# Patient Record
Sex: MALE | Race: WHITE | NOT HISPANIC OR LATINO | URBAN - METROPOLITAN AREA
[De-identification: names, ages, dates, MRNs, and addresses within clinical notes are randomized per-mention and may not be internally consistent; named-entity substitution may affect disease eponyms.]

---

## 2017-04-05 ENCOUNTER — OUTPATIENT (OUTPATIENT)
Dept: OUTPATIENT SERVICES | Facility: HOSPITAL | Age: 52
LOS: 1 days | End: 2017-04-05

## 2017-04-05 ENCOUNTER — APPOINTMENT (OUTPATIENT)
Dept: RADIOLOGY | Facility: HOSPITAL | Age: 52
End: 2017-04-05

## 2017-04-05 DIAGNOSIS — R76.11 NONSPECIFIC REACTION TO TUBERCULIN SKIN TEST WITHOUT ACTIVE TUBERCULOSIS: ICD-10-CM

## 2020-02-05 ENCOUNTER — TRANSCRIPTION ENCOUNTER (OUTPATIENT)
Age: 55
End: 2020-02-05

## 2021-09-28 ENCOUNTER — EMERGENCY (EMERGENCY)
Facility: HOSPITAL | Age: 56
LOS: 1 days | Discharge: HOME | End: 2021-09-28
Attending: EMERGENCY MEDICINE
Payer: COMMERCIAL

## 2021-09-28 VITALS
SYSTOLIC BLOOD PRESSURE: 120 MMHG | TEMPERATURE: 98 F | HEART RATE: 80 BPM | RESPIRATION RATE: 18 BRPM | OXYGEN SATURATION: 100 % | DIASTOLIC BLOOD PRESSURE: 80 MMHG

## 2021-09-28 VITALS
DIASTOLIC BLOOD PRESSURE: 73 MMHG | WEIGHT: 154.98 LBS | HEIGHT: 72 IN | RESPIRATION RATE: 24 BRPM | SYSTOLIC BLOOD PRESSURE: 137 MMHG | HEART RATE: 98 BPM | OXYGEN SATURATION: 100 %

## 2021-09-28 LAB
ALBUMIN SERPL ELPH-MCNC: 4.8 G/DL — SIGNIFICANT CHANGE UP (ref 3.3–5)
ALP SERPL-CCNC: 95 U/L — SIGNIFICANT CHANGE UP (ref 40–120)
ALT FLD-CCNC: 13 U/L — SIGNIFICANT CHANGE UP (ref 10–45)
ANION GAP SERPL CALC-SCNC: 19 MMOL/L — HIGH (ref 5–17)
APPEARANCE UR: CLEAR — SIGNIFICANT CHANGE UP
AST SERPL-CCNC: 15 U/L — SIGNIFICANT CHANGE UP (ref 10–40)
BACTERIA # UR AUTO: NEGATIVE — SIGNIFICANT CHANGE UP
BASOPHILS # BLD AUTO: 0.09 K/UL — SIGNIFICANT CHANGE UP (ref 0–0.2)
BASOPHILS NFR BLD AUTO: 0.9 % — SIGNIFICANT CHANGE UP (ref 0–2)
BILIRUB SERPL-MCNC: 0.8 MG/DL — SIGNIFICANT CHANGE UP (ref 0.2–1.2)
BILIRUB UR-MCNC: NEGATIVE — SIGNIFICANT CHANGE UP
BUN SERPL-MCNC: 14 MG/DL — SIGNIFICANT CHANGE UP (ref 7–23)
CALCIUM SERPL-MCNC: 10.1 MG/DL — SIGNIFICANT CHANGE UP (ref 8.4–10.5)
CHLORIDE SERPL-SCNC: 101 MMOL/L — SIGNIFICANT CHANGE UP (ref 96–108)
CO2 SERPL-SCNC: 18 MMOL/L — LOW (ref 22–31)
COLOR SPEC: SIGNIFICANT CHANGE UP
CREAT SERPL-MCNC: 0.91 MG/DL — SIGNIFICANT CHANGE UP (ref 0.5–1.3)
DIFF PNL FLD: ABNORMAL
EOSINOPHIL # BLD AUTO: 0.23 K/UL — SIGNIFICANT CHANGE UP (ref 0–0.5)
EOSINOPHIL NFR BLD AUTO: 2.2 % — SIGNIFICANT CHANGE UP (ref 0–6)
EPI CELLS # UR: 2 /HPF — SIGNIFICANT CHANGE UP
GLUCOSE SERPL-MCNC: 148 MG/DL — HIGH (ref 70–99)
GLUCOSE UR QL: NEGATIVE — SIGNIFICANT CHANGE UP
HCT VFR BLD CALC: 44.4 % — SIGNIFICANT CHANGE UP (ref 39–50)
HGB BLD-MCNC: 15 G/DL — SIGNIFICANT CHANGE UP (ref 13–17)
IMM GRANULOCYTES NFR BLD AUTO: 0.3 % — SIGNIFICANT CHANGE UP (ref 0–1.5)
KETONES UR-MCNC: ABNORMAL
LEUKOCYTE ESTERASE UR-ACNC: NEGATIVE — SIGNIFICANT CHANGE UP
LYMPHOCYTES # BLD AUTO: 2.44 K/UL — SIGNIFICANT CHANGE UP (ref 1–3.3)
LYMPHOCYTES # BLD AUTO: 23.2 % — SIGNIFICANT CHANGE UP (ref 13–44)
MCHC RBC-ENTMCNC: 29.6 PG — SIGNIFICANT CHANGE UP (ref 27–34)
MCHC RBC-ENTMCNC: 33.8 GM/DL — SIGNIFICANT CHANGE UP (ref 32–36)
MCV RBC AUTO: 87.6 FL — SIGNIFICANT CHANGE UP (ref 80–100)
MONOCYTES # BLD AUTO: 0.81 K/UL — SIGNIFICANT CHANGE UP (ref 0–0.9)
MONOCYTES NFR BLD AUTO: 7.7 % — SIGNIFICANT CHANGE UP (ref 2–14)
NEUTROPHILS # BLD AUTO: 6.91 K/UL — SIGNIFICANT CHANGE UP (ref 1.8–7.4)
NEUTROPHILS NFR BLD AUTO: 65.7 % — SIGNIFICANT CHANGE UP (ref 43–77)
NITRITE UR-MCNC: NEGATIVE — SIGNIFICANT CHANGE UP
NRBC # BLD: 0 /100 WBCS — SIGNIFICANT CHANGE UP (ref 0–0)
PH UR: 7.5 — SIGNIFICANT CHANGE UP (ref 5–8)
PLATELET # BLD AUTO: 170 K/UL — SIGNIFICANT CHANGE UP (ref 150–400)
POTASSIUM SERPL-MCNC: 3.9 MMOL/L — SIGNIFICANT CHANGE UP (ref 3.5–5.3)
POTASSIUM SERPL-SCNC: 3.9 MMOL/L — SIGNIFICANT CHANGE UP (ref 3.5–5.3)
PROT SERPL-MCNC: 7.7 G/DL — SIGNIFICANT CHANGE UP (ref 6–8.3)
PROT UR-MCNC: ABNORMAL
RBC # BLD: 5.07 M/UL — SIGNIFICANT CHANGE UP (ref 4.2–5.8)
RBC # FLD: 12.4 % — SIGNIFICANT CHANGE UP (ref 10.3–14.5)
RBC CASTS # UR COMP ASSIST: 8 /HPF — HIGH (ref 0–4)
SODIUM SERPL-SCNC: 138 MMOL/L — SIGNIFICANT CHANGE UP (ref 135–145)
SP GR SPEC: 1.02 — SIGNIFICANT CHANGE UP (ref 1.01–1.02)
UROBILINOGEN FLD QL: NEGATIVE — SIGNIFICANT CHANGE UP
WBC # BLD: 10.51 K/UL — HIGH (ref 3.8–10.5)
WBC # FLD AUTO: 10.51 K/UL — HIGH (ref 3.8–10.5)
WBC UR QL: 3 /HPF — SIGNIFICANT CHANGE UP (ref 0–5)

## 2021-09-28 PROCEDURE — 51702 INSERT TEMP BLADDER CATH: CPT

## 2021-09-28 PROCEDURE — 99284 EMERGENCY DEPT VISIT MOD MDM: CPT | Mod: 25

## 2021-09-28 PROCEDURE — 96374 THER/PROPH/DIAG INJ IV PUSH: CPT | Mod: XU

## 2021-09-28 PROCEDURE — 81001 URINALYSIS AUTO W/SCOPE: CPT

## 2021-09-28 PROCEDURE — 51703 INSERT BLADDER CATH COMPLEX: CPT

## 2021-09-28 PROCEDURE — 80053 COMPREHEN METABOLIC PANEL: CPT

## 2021-09-28 PROCEDURE — 87086 URINE CULTURE/COLONY COUNT: CPT

## 2021-09-28 PROCEDURE — 85025 COMPLETE CBC W/AUTO DIFF WBC: CPT

## 2021-09-28 RX ORDER — MORPHINE SULFATE 50 MG/1
4 CAPSULE, EXTENDED RELEASE ORAL ONCE
Refills: 0 | Status: DISCONTINUED | OUTPATIENT
Start: 2021-09-28 | End: 2021-09-28

## 2021-09-28 RX ORDER — TAMSULOSIN HYDROCHLORIDE 0.4 MG/1
1 CAPSULE ORAL
Qty: 30 | Refills: 0
Start: 2021-09-28 | End: 2021-10-27

## 2021-09-28 RX ORDER — TAMSULOSIN HYDROCHLORIDE 0.4 MG/1
1 CAPSULE ORAL
Qty: 30 | Refills: 0 | DISCHARGE
Start: 2021-09-28 | End: 2021-10-27

## 2021-09-28 RX ADMIN — MORPHINE SULFATE 4 MILLIGRAM(S): 50 CAPSULE, EXTENDED RELEASE ORAL at 02:01

## 2021-09-28 NOTE — ED PROVIDER NOTE - NS ED ROS FT
GENERAL: No fever, no chills  EYES: no change in vision  HEENT: no trouble swallowing, no trouble speaking  CARDIAC: no chest pain, no palpitations  PULMONARY: no cough, no SOB  GI: +abdominal pain, no nausea, no vomiting, no diarrhea, no constipation  : no dysuria, no frequency, no change in appearance, no odor of urine  SKIN: no rashes  NEURO: no headache, no weakness  MSK: no joint pain

## 2021-09-28 NOTE — ED PROVIDER NOTE - ATTENDING CONTRIBUTION TO CARE
MD Nicole:  patient seen and evaluated personally.   I agree with the History & Physical,  Impression & Plan other than what was detailed in my note.  MD Nicole  57 y/o m presenting w/ urinary retention, abd pain in sp area that is severe, no known hx of bph but states has had increasing urination at night over past few months. no f/c n/v, dysuira, hematuria, no pain prior to retention, no back pain/injury. afebrile vitals stable,  non toxic well appearing, NC/AT,  conjunctiva non conjected, sclera anicteric, moist mucous membranes, neck supple, heart sounds, normal, no mrg, lungs cta b/l no wrr, abd soft non distended w/ no tenderness, no visual deformities of extremities, axox3, espinoza in place (placed by urology) , no abd ttp. plan to fu ua, cr, if neg dc home w/ espinoza to fu urology.

## 2021-09-28 NOTE — ED PROVIDER NOTE - CLINICAL SUMMARY MEDICAL DECISION MAKING FREE TEXT BOX
57y/o M w/ unknown PMH p/w gradual onset lower abdominal pain dull has gotten worse and has not been able to pass urine since apprx 4pm likely urinary retention. Plan Cr, UA/Ucx, espinoza to be place by urology, pain meds.

## 2021-09-28 NOTE — ED PROVIDER NOTE - PROGRESS NOTE DETAILS
Allan, PGY3: attempted Gleason passage with 16Fr and coudey without success. urology paged, will come see pt. Attending Masom:  pt feeling better, pain completely gone. comfortable with dc, cr normal, awaiting ua results, outpt fu w/ urology

## 2021-09-28 NOTE — ED ADULT NURSE NOTE - NSIMPLEMENTINTERV_GEN_ALL_ED
Implemented All Fall Risk Interventions:  [place] to call system. Call bell, personal items and telephone within reach. Instruct patient to call for assistance. Room bathroom lighting operational. Non-slip footwear when patient is off stretcher. Physically safe environment: no spills, clutter or unnecessary equipment. Stretcher in lowest position, wheels locked, appropriate side rails in place. Provide visual cue, wrist band, yellow gown, etc. Monitor gait and stability. Monitor for mental status changes and reorient to person, place, and time. Review medications for side effects contributing to fall risk. Reinforce activity limits and safety measures with patient and family.

## 2021-09-28 NOTE — ED PROVIDER NOTE - PHYSICAL EXAMINATION
Gen: uncomfortable appearing  Head: normal appearing  HEENT: normal conjunctiva, oral mucosa moist  Lung: no respiratory distress, speaking in full sentences, CTA b/l     CV: regular rate and rhythm, no murmurs  Abd: soft, suprpubic region distended with tenderness  MSK: no visible deformities  Neuro: No focal deficits, AAOx3  Skin: Warm  Psych: normal affect

## 2021-09-28 NOTE — PROCEDURE NOTE - NSAPPROACH_GEN_A_CORE
open Crescentic Intermediate Repair Preamble Text (Leave Blank If You Do Not Want): Undermining was performed with blunt dissection.

## 2021-09-28 NOTE — ED PROVIDER NOTE - NSFOLLOWUPCLINICS_GEN_ALL_ED_FT
[facility] - Urology  Urology  [address], [address]rd & [address]th [place] [address]  Phone: [phone]  Fax:   Follow Up Time: 4-6 Days

## 2021-09-28 NOTE — ED PROCEDURE NOTE - PROCEDURE ADDITIONAL DETAILS
Emergency Department Focused Ultrasound performed at patient's bedside for educational purposes. The study will have a follow up study performed (Gleason placement). 450cc calculated on bladder scan, Gleason drained 500cc urine

## 2021-09-28 NOTE — ED PROVIDER NOTE - OBJECTIVE STATEMENT
55y/o M w/ unknown PMH p/w gradual onset lower abdominal pain dull has gotten worse and has not been able to pass urine since apprx 4pm. Never had this happen before. No fevers, chills, nausea, vomiting, headache, chest pain, cough, sob, hemoptysis, changes in diet, diarrhea, obstipation, back pain, dysuria, hematuria, numbness, tingling, history of surgeries, trauma, recreational drugs, excessive alcohol.

## 2021-09-28 NOTE — ED PROVIDER NOTE - NSFOLLOWUPINSTRUCTIONS_ED_ALL_ED_FT
Urinary Retention    Urinary retention is the inability to completely empty your bladder. This is a common problem in older men, especially with enlarged prostates. If you are sent home with a espinoza catheter and a drainage system make sure to keep the drainage bag emptied and lower than your catheter. Keep the espinoza catheter in until you follow up with a urologist.    SEEK IMMEDIATE MEDICAL CARE IF YOU DEVELOP THE FOLLOWING SYMPTOMS: the catheter stops draining urine, the catheter falls out, abdominal pain, nausea/vomiting, or chills/fever.     -Please follow up with the urologist in 5 days and bring your paperwork

## 2021-09-28 NOTE — ED ADULT NURSE NOTE - OBJECTIVE STATEMENT
The pt is a 55 y/o M no stated PMH presenting to the ED c/o urinary retention since tonight. The patient reports increasing pain and inability to urinate since 1600. Upon assessment, pt is AO x 4, uncomfortable appearing, speaking in full and complete sentences, equal strength bilaterally, skin warm, dry and intact, present peripheral pulses, PERRL. Pt denies fever, chills, n/v, CP, dizziness, weakness, HA, SOB, abd pain. Pt positioned for comfort, appropriate side rails raised, wheels locked, bed in lowest position, pt denies needs at this time.

## 2021-09-28 NOTE — ED PROVIDER NOTE - PATIENT PORTAL LINK FT
You can access the FollowMyHealth Patient Portal offered by [facility] by registering at the following website: [email or web address]. By joining [employer]’s FollowMyHealth portal, you will also be able to view your health information using other applications (apps) compatible with our system.

## 2021-09-28 NOTE — PROCEDURE NOTE - ADDITIONAL PROCEDURE DETAILS
Called by ED for difficult espinoza in patient with bladder scan >500 and complaining of suprapubic pain. Multiple unsuccessful attempts by primary team prior to arrival.  Using aseptic technique, 18F coude was easily passed. (+) return of urine. 10cc of sterile water inserted into the balloon. ~800cc of clear yellow urine drained. Patient tolerated well.    Optimize Patient to Pass Trial of Void  -encourage ambulation/out of bed  -initiate/continue bowel regimen  -minimize narcotics and benzodiazepines  -minimize anticholinergics/antihistamines  -flomax      [facility]  [address] [place] [address], [address] [address] [phone]

## 2021-09-28 NOTE — ED ADULT NURSE REASSESSMENT NOTE - NS ED NURSE REASSESS COMMENT FT1
Espinoza attempted x 2 by RN, urology to place espinoza catheter.
Urology at bedside placing espinoza.
MD Lemus at bedside discussing results with patient.

## 2021-09-29 ENCOUNTER — TRANSCRIPTION ENCOUNTER (OUTPATIENT)
Age: 56
End: 2021-09-29

## 2021-09-29 PROBLEM — Z78.9 OTHER SPECIFIED HEALTH STATUS: Chronic | Status: ACTIVE | Noted: 2021-09-28

## 2021-09-29 LAB
CULTURE RESULTS: NO GROWTH — SIGNIFICANT CHANGE UP
SPECIMEN SOURCE: SIGNIFICANT CHANGE UP

## 2021-09-30 ENCOUNTER — APPOINTMENT (OUTPATIENT)
Dept: UROLOGY | Facility: CLINIC | Age: 56
End: 2021-09-30
Payer: COMMERCIAL

## 2021-09-30 VITALS
SYSTOLIC BLOOD PRESSURE: 107 MMHG | TEMPERATURE: 95.6 F | HEIGHT: 72 IN | RESPIRATION RATE: 17 BRPM | WEIGHT: 152 LBS | HEART RATE: 83 BPM | BODY MASS INDEX: 20.59 KG/M2 | DIASTOLIC BLOOD PRESSURE: 68 MMHG | OXYGEN SATURATION: 100 %

## 2021-09-30 PROCEDURE — 99204 OFFICE O/P NEW MOD 45 MIN: CPT

## 2021-09-30 NOTE — ASSESSMENT
[FreeTextEntry1] : Mr. Richard is a 56 y.o. M who presents after an episode of acute urinary retention occurring 2 days ago.\par \par Discussed the etiology of retention in males, in general, is typically due to either a bladder contraction issue, or a blockage issue.  Discussed that blockage issues, most notably enlarged prostate, is most common.  Based on his obstructive symptoms at home and difficulty with catheter placement, BPE is the most likely etiology.\par \par Regarding the acute management of his catheter, discussed that based on the difficult catheter placement and several unsuccessful attempts, plus the fact that he only had this in for 2 days, I would favor leaving the catheter in for a few more days before removing this to optimize the chances of a successful trial of void.  He was on board with this.  We came up with the following plan\par \par - RV on Tuesday for espinoza removal, trial of void, uroflow, and TRUS to assess prostate volume\par - Continue flomax. Briefly discussed that we can trial additional medications, including finasteride, based on the size of his prostate.  Also discussed that in general we progress in a stepwise fashion, with medications, then office-based procedures, and then surgery.\par - Patient inquiring about PSA screening.  We will perform this after retention episode has resolved, as it would likely the very elevated now given the catheter placement.\par -Recommended following up with PCP regarding episode of loss of consciousness.  Could be related to IV narcotic use, flomax, and pain from catheterization, however, more serious conditions should be ruled out.\par \par

## 2021-09-30 NOTE — PHYSICAL EXAM
[General Appearance - Well Developed] : well developed [General Appearance - Well Nourished] : well nourished [Heart Rate And Rhythm] : Heart rate and rhythm were normal [] : no respiratory distress [Respiration, Rhythm And Depth] : normal respiratory rhythm and effort [Bowel Sounds] : normal bowel sounds [Abdomen Soft] : soft [Costovertebral Angle Tenderness] : no ~M costovertebral angle tenderness [FreeTextEntry1] : circumcised phallus, orthotopic meatus. 18F coude catheter draining yellow urine. Bilateral descended testicles, no nodules or pain. Left grade III varicocele. ELLIE with smooth prostate, ~35g [Normal Station and Gait] : the gait and station were normal for the patient's age [Skin Color & Pigmentation] : normal skin color and pigmentation [No Focal Deficits] : no focal deficits [Oriented To Time, Place, And Person] : oriented to person, place, and time [No Palpable Adenopathy] : no palpable adenopathy

## 2021-09-30 NOTE — HISTORY OF PRESENT ILLNESS
[FreeTextEntry1] : MIKEY RUTH is a 56 year M who presents today as a new patient evaluation for retention.\par \par He was seen in the emergency department early in the morning on September 28, with urinary retention.  After several unsuccessful catheter placement attempts, an 18 [place] coudé was placed by the urology PA.  He returns today.\par \par He has never had an episode of retention prior to this. Prior to this, his LUTS included nocturia 2-3 times. He had sensation of incomplete emptying, no weak stream. During the day, he was voiding ~3 times. Some urgency without incontinence. No UTIs in the past, no stones in kidney or bladder. No  surgeries in the past. No family history of prostate cancer. Has never had his PSA checked as far as he knows. \par \par AUASS - 0/2/0/0/0/0/0 \par \par This retention episode occurred on Monday evening. No new medications prior to this. No alcohol use, drug use. Wasn't sick, no recent travel prior to this. No neurologic diagnoses, diabetes. \par \par He is tolerating the catheter ok, having intermittent spasms which are getting better. He has been on flomax since left the hospital. \par \par Labs in the ED were notable for:\par WBC 10.5\par Cr 0.91\par UA: trace protein, 8 rbc, 3 wbc\par Urine culture: Negative\par \par Denies gross hematuria, flank pain, fevers, chills, nausea, vomiting. \par \par His wife also reports shortly after leaving the hospital, he had an episode where he lost consciousness for several seconds, but did not fall or hit his head.\par

## 2021-10-05 ENCOUNTER — OUTPATIENT (OUTPATIENT)
Dept: OUTPATIENT SERVICES | Facility: HOSPITAL | Age: 56
LOS: 1 days | End: 2021-10-05
Payer: COMMERCIAL

## 2021-10-05 ENCOUNTER — APPOINTMENT (OUTPATIENT)
Dept: CT IMAGING | Facility: CLINIC | Age: 56
End: 2021-10-05
Payer: COMMERCIAL

## 2021-10-05 ENCOUNTER — APPOINTMENT (OUTPATIENT)
Dept: UROLOGY | Facility: CLINIC | Age: 56
End: 2021-10-05
Payer: COMMERCIAL

## 2021-10-05 VITALS
SYSTOLIC BLOOD PRESSURE: 94 MMHG | WEIGHT: 152 LBS | BODY MASS INDEX: 20.59 KG/M2 | HEIGHT: 72 IN | DIASTOLIC BLOOD PRESSURE: 67 MMHG | RESPIRATION RATE: 18 BRPM | HEART RATE: 90 BPM

## 2021-10-05 VITALS — RESPIRATION RATE: 18 BRPM | DIASTOLIC BLOOD PRESSURE: 70 MMHG | SYSTOLIC BLOOD PRESSURE: 100 MMHG | HEART RATE: 83 BPM

## 2021-10-05 DIAGNOSIS — R33.9 RETENTION OF URINE, UNSPECIFIED: ICD-10-CM

## 2021-10-05 PROCEDURE — 99214 OFFICE O/P EST MOD 30 MIN: CPT

## 2021-10-05 PROCEDURE — 74176 CT ABD & PELVIS W/O CONTRAST: CPT

## 2021-10-05 PROCEDURE — 74176 CT ABD & PELVIS W/O CONTRAST: CPT | Mod: 26

## 2021-10-05 NOTE — ASSESSMENT
[FreeTextEntry1] : Mr. Richard is a 56 y.o. M who presents after an episode of acute urinary retention.\par \par Today, his catheter was filled with 240 mL of sterile water and his espinoza was removed. He voided 120 mL initially with a PVR of ~130 mL. He voided into the uroflow machine, however, the printer was malfunctioning so we did not get a read out. Several minutes later, he was able to void another 120 cc with a PVR of 0.\par \par Discussed the etiology of retention in males, in general, is typically due to either a bladder contraction issue, or a blockage issue.  Discussed that blockage issues, most notably enlarged prostate, is most common.  Based on his obstructive symptoms at home and difficulty with catheter placement, BPE is the most likely etiology. He was able to fully void today. \par \par - Discussed the importance of double voiding, timed voids.\par - Continue flomax. Briefly discussed that we can trial additional medications, including finasteride, based on the size of his prostate.  Also discussed that in general we progress in a stepwise fashion, with medications, then office-based procedures, and then surgery.\par - Patient inquiring about PSA screening.  We will perform this after retention episode has resolved, as it would likely the very elevated now given the catheter placement.\par - Had initially planned for TRUS to determine prostate volume, however, patient also inquiring about US / assessment of kidneys. Discussed that we could perform a formal renal / bladder US as well. Also discussed that he could undergo a non-contrast CT which would evaluate for hydronephrosis, stones, and prostate size. Discussed the risk of radiation. They would like to choose this option. \par

## 2021-10-05 NOTE — PHYSICAL EXAM
[General Appearance - Well Developed] : well developed [General Appearance - Well Nourished] : well nourished [Bowel Sounds] : normal bowel sounds [Abdomen Soft] : soft [Costovertebral Angle Tenderness] : no ~M costovertebral angle tenderness [FreeTextEntry1] : circumcised phallus, orthotopic meatus. 18F coude catheter draining yellow urine. Bilateral descended testicles, no nodules or pain. Left grade III varicocele. ELLIE with smooth prostate, ~35g [Skin Color & Pigmentation] : normal skin color and pigmentation

## 2021-10-11 RX ORDER — TAMSULOSIN HYDROCHLORIDE 0.4 MG/1
0.4 CAPSULE ORAL
Qty: 90 | Refills: 2 | Status: ACTIVE | COMMUNITY
Start: 2021-10-11 | End: 1900-01-01

## 2021-10-13 ENCOUNTER — APPOINTMENT (OUTPATIENT)
Dept: UROLOGY | Facility: CLINIC | Age: 56
End: 2021-10-13

## 2021-12-20 ENCOUNTER — TRANSCRIPTION ENCOUNTER (OUTPATIENT)
Age: 56
End: 2021-12-20

## 2024-11-06 ENCOUNTER — NON-APPOINTMENT (OUTPATIENT)
Age: 59
End: 2024-11-06

## 2025-03-25 ENCOUNTER — APPOINTMENT (OUTPATIENT)
Dept: UROLOGY | Facility: CLINIC | Age: 60
End: 2025-03-25

## 2025-04-06 ENCOUNTER — NON-APPOINTMENT (OUTPATIENT)
Age: 60
End: 2025-04-06

## 2025-04-29 ENCOUNTER — APPOINTMENT (OUTPATIENT)
Dept: UROLOGY | Facility: CLINIC | Age: 60
End: 2025-04-29
Payer: COMMERCIAL

## 2025-04-29 VITALS
BODY MASS INDEX: 22.21 KG/M2 | OXYGEN SATURATION: 96 % | SYSTOLIC BLOOD PRESSURE: 116 MMHG | WEIGHT: 164 LBS | HEIGHT: 72 IN | HEART RATE: 91 BPM | DIASTOLIC BLOOD PRESSURE: 79 MMHG

## 2025-04-29 DIAGNOSIS — C61 MALIGNANT NEOPLASM OF PROSTATE: ICD-10-CM

## 2025-04-29 PROCEDURE — G2211 COMPLEX E/M VISIT ADD ON: CPT | Mod: NC

## 2025-04-29 PROCEDURE — 99205 OFFICE O/P NEW HI 60 MIN: CPT

## 2025-04-29 RX ORDER — FINASTERIDE 1 MG/1
TABLET ORAL
Refills: 0 | Status: ACTIVE | COMMUNITY

## 2025-05-01 ENCOUNTER — APPOINTMENT (OUTPATIENT)
Dept: UROLOGY | Facility: CLINIC | Age: 60
End: 2025-05-01
Payer: COMMERCIAL

## 2025-05-01 ENCOUNTER — NON-APPOINTMENT (OUTPATIENT)
Age: 60
End: 2025-05-01

## 2025-05-01 ENCOUNTER — OUTPATIENT (OUTPATIENT)
Dept: OUTPATIENT SERVICES | Facility: HOSPITAL | Age: 60
LOS: 1 days | End: 2025-05-01
Payer: COMMERCIAL

## 2025-05-01 VITALS — HEART RATE: 82 BPM | SYSTOLIC BLOOD PRESSURE: 130 MMHG | DIASTOLIC BLOOD PRESSURE: 78 MMHG

## 2025-05-01 VITALS — SYSTOLIC BLOOD PRESSURE: 120 MMHG | DIASTOLIC BLOOD PRESSURE: 76 MMHG | HEART RATE: 80 BPM

## 2025-05-01 DIAGNOSIS — R33.9 RETENTION OF URINE, UNSPECIFIED: ICD-10-CM

## 2025-05-01 DIAGNOSIS — N13.8 BENIGN PROSTATIC HYPERPLASIA WITH LOWER URINARY TRACT SYMPMS: ICD-10-CM

## 2025-05-01 DIAGNOSIS — N40.1 BENIGN PROSTATIC HYPERPLASIA WITH LOWER URINARY TRACT SYMPMS: ICD-10-CM

## 2025-05-01 DIAGNOSIS — R35.0 FREQUENCY OF MICTURITION: ICD-10-CM

## 2025-05-01 PROCEDURE — 51702 INSERT TEMP BLADDER CATH: CPT

## 2025-05-01 PROCEDURE — 99214 OFFICE O/P EST MOD 30 MIN: CPT | Mod: 25

## 2025-05-02 ENCOUNTER — OUTPATIENT (OUTPATIENT)
Dept: OUTPATIENT SERVICES | Facility: HOSPITAL | Age: 60
LOS: 1 days | End: 2025-05-02

## 2025-05-02 VITALS
WEIGHT: 164.02 LBS | DIASTOLIC BLOOD PRESSURE: 76 MMHG | OXYGEN SATURATION: 97 % | HEART RATE: 80 BPM | TEMPERATURE: 98 F | SYSTOLIC BLOOD PRESSURE: 115 MMHG | RESPIRATION RATE: 16 BRPM | HEIGHT: 72 IN

## 2025-05-02 DIAGNOSIS — Z98.890 OTHER SPECIFIED POSTPROCEDURAL STATES: Chronic | ICD-10-CM

## 2025-05-02 DIAGNOSIS — R33.9 RETENTION OF URINE, UNSPECIFIED: ICD-10-CM

## 2025-05-02 DIAGNOSIS — Z91.89 OTHER SPECIFIED PERSONAL RISK FACTORS, NOT ELSEWHERE CLASSIFIED: ICD-10-CM

## 2025-05-02 PROBLEM — N40.1 BENIGN LOCALIZED HYPERPLASIA OF PROSTATE WITH URINARY OBSTRUCTION: Status: ACTIVE | Noted: 2025-04-30

## 2025-05-02 PROBLEM — C61 CANCER OF PROSTATE: Status: ACTIVE | Noted: 2025-05-02

## 2025-05-02 NOTE — H&P PST ADULT - RESPIRATORY
normal/clear to auscultation bilaterally/no rhonchi/breath sounds equal normal/no rhonchi/breath sounds equal

## 2025-05-02 NOTE — H&P PST ADULT - HISTORY OF PRESENT ILLNESS
59 year-old man who had a prostate biopsy in February showing [place] 3+4 prostate cancer. He underwent cryoablation on March 31st 2025 with Dr. Arreola. He went into urinary retention after the focal cryoablation. He failed three TOVs. Gleason cath was placed yesterday Dr. Capps. patient is scheduled for cystoscopy bipolar TURP? 59 year-old man who had a prostate biopsy in February showing [place] 3+4 prostate cancer. He underwent cryoablation on March 31st 2025 with Dr. Arreola. He went into urinary retention after the focal cryoablation. He failed three TOVs. Gleason cath was placed yesterday at Dr. Capps's office. Patient is scheduled for cystoscopy bipolar TURP? 59 year-old man who had a prostate biopsy in February showing [place] 3+4 prostate cancer. He underwent cryoablation on March 31st 2025 with Dr. Arreola. He went into urinary retention after the focal cryoablation. He failed three TOVs. Gleason cath was placed on 5/1/25 at Dr. Capps's office. Patient is scheduled for cystoscopy bipolar TURP?

## 2025-05-02 NOTE — H&P PST ADULT - GENITOURINARY
Pt is alert awake, and appropriate, in no acute distress, o2 sat 100% on room air clear lungs b/l, no increased work of breathing, will continue to monitor status post casting and pulses, movement and sensation present b/l. PO Tolerated awaiting discahrge
Pt is alert awake, and appropriate, in no acute distress, o2 sat 100% on room air clear lungs b/l, no increased work of breathing, will continue to monitor pain improvement verbalized: movement, sensation and pulses present b/l
Pt is alert awake, and appropriate, in no acute distress, o2 sat 100% on room air clear lungs b/l, no increased work of breathing, will continue to monitor pt has left arm deformity, IV morphine given as per MD order for pain, will reasess pain. pulses present b/l, movement present b/l and sensation present b/l
details…

## 2025-05-02 NOTE — H&P PST ADULT - GENERAL GENITOURINARY SYMPTOMS
BPH, urinary retention s/p Gleason placement at [facility] BPH, urinary retention s/p Gleason placement at [facility] office/urinary hesitancy BPH, urinary retention s/p Gleason placement at [facility] office on 5/1/25/urinary hesitancy

## 2025-05-02 NOTE — H&P PST ADULT - ATTENDING COMMENTS
patient provided with antibiotics several days prior to surgery to address bacteruria, likely catheter-related colonization, prior to surgery.

## 2025-05-02 NOTE — H&P PST ADULT - PROBLEM SELECTOR PLAN 1
Patient tentatively scheduled for cryoablation TURP     Pre-op instructions provided.  Famotidine provided with instructions. Teach-back method was utilized to assess patient's understanding. Patient verbalized understanding.    ordered Ucx Patient tentatively scheduled for cryoablation TURP     Pre-op instructions provided.  Famotidine provided with instructions. Teach-back method was utilized to assess patient's understanding. Patient verbalized understanding.    ordered Ucx    Patient instructed to take tamsulosin with a sip of water on the morning of procedure.

## 2025-05-02 NOTE — H&P PST ADULT - NSICDXPASTMEDICALHX_GEN_ALL_CORE_FT
PAST MEDICAL HISTORY:  BPH (benign prostatic hyperplasia)     Cancer of prostate      PAST MEDICAL HISTORY:  BPH (benign prostatic hyperplasia)     Cancer of prostate     Urine retention

## 2025-05-03 PROBLEM — Z78.9 OTHER SPECIFIED HEALTH STATUS: Chronic | Status: INACTIVE | Noted: 2021-09-28 | Resolved: 2025-05-02

## 2025-05-03 LAB
APPEARANCE: CLEAR
BACTERIA: NEGATIVE /HPF
BILIRUBIN URINE: NEGATIVE
BLOOD URINE: ABNORMAL
CAST: 0 /LPF
COLOR: YELLOW
CULTURE RESULTS: SIGNIFICANT CHANGE UP
EPITHELIAL CELLS: 0 /HPF
GLUCOSE QUALITATIVE U: NEGATIVE MG/DL
KETONES URINE: NEGATIVE MG/DL
LEUKOCYTE ESTERASE URINE: ABNORMAL
MICROSCOPIC-UA: NORMAL
NITRITE URINE: NEGATIVE
PH URINE: 7
PROTEIN URINE: NEGATIVE MG/DL
RED BLOOD CELLS URINE: 0 /HPF
REVIEW: NORMAL
SPECIFIC GRAVITY URINE: <1.005
SPECIMEN SOURCE: SIGNIFICANT CHANGE UP
UROBILINOGEN URINE: 0.2 MG/DL
WHITE BLOOD CELLS URINE: 1 /HPF

## 2025-05-05 ENCOUNTER — NON-APPOINTMENT (OUTPATIENT)
Age: 60
End: 2025-05-05

## 2025-05-05 DIAGNOSIS — R33.9 RETENTION OF URINE, UNSPECIFIED: ICD-10-CM

## 2025-05-05 DIAGNOSIS — A49.9 URINARY TRACT INFECTION, SITE NOT SPECIFIED: ICD-10-CM

## 2025-05-05 DIAGNOSIS — N40.1 BENIGN PROSTATIC HYPERPLASIA WITH LOWER URINARY TRACT SYMPTOMS: ICD-10-CM

## 2025-05-05 DIAGNOSIS — N39.0 URINARY TRACT INFECTION, SITE NOT SPECIFIED: ICD-10-CM

## 2025-05-05 RX ORDER — AMOXICILLIN 875 MG/1
875 TABLET, FILM COATED ORAL
Qty: 14 | Refills: 0 | Status: ACTIVE | COMMUNITY
Start: 2025-05-05 | End: 1900-01-01

## 2025-05-06 LAB — BACTERIA UR CULT: ABNORMAL

## 2025-05-06 NOTE — ASU PATIENT PROFILE, ADULT - FALL HARM RISK - UNIVERSAL INTERVENTIONS
Bed in lowest position, wheels locked, appropriate side rails in place/Call bell, personal items and telephone in reach/Instruct patient to call for assistance before getting out of bed or chair/Non-slip footwear when patient is out of bed/[place] to call system/Physically safe environment - no spills, clutter or unnecessary equipment/Purposeful Proactive Rounding/Room/bathroom lighting operational, light cord in reach

## 2025-05-06 NOTE — ASU PATIENT PROFILE, ADULT - NSICDXPASTMEDICALHX_GEN_ALL_CORE_FT
PAST MEDICAL HISTORY:  BPH (benign prostatic hyperplasia)     Cancer of prostate     Urine retention

## 2025-05-07 ENCOUNTER — TRANSCRIPTION ENCOUNTER (OUTPATIENT)
Age: 60
End: 2025-05-07

## 2025-05-07 ENCOUNTER — RESULT REVIEW (OUTPATIENT)
Age: 60
End: 2025-05-07

## 2025-05-07 ENCOUNTER — INPATIENT (INPATIENT)
Facility: HOSPITAL | Age: 60
LOS: 1 days | Discharge: HOME | End: 2025-05-09
Attending: UROLOGY | Admitting: UROLOGY
Payer: COMMERCIAL

## 2025-05-07 ENCOUNTER — APPOINTMENT (OUTPATIENT)
Dept: UROLOGY | Facility: HOSPITAL | Age: 60
End: 2025-05-07

## 2025-05-07 VITALS
SYSTOLIC BLOOD PRESSURE: 111 MMHG | RESPIRATION RATE: 17 BRPM | OXYGEN SATURATION: 99 % | TEMPERATURE: 98 F | DIASTOLIC BLOOD PRESSURE: 76 MMHG | WEIGHT: 164.24 LBS | HEART RATE: 79 BPM | HEIGHT: 72 IN

## 2025-05-07 DIAGNOSIS — R33.9 RETENTION OF URINE, UNSPECIFIED: ICD-10-CM

## 2025-05-07 DIAGNOSIS — N40.0 BENIGN PROSTATIC HYPERPLASIA WITHOUT LOWER URINARY TRACT SYMPTOMS: ICD-10-CM

## 2025-05-07 DIAGNOSIS — Z98.890 OTHER SPECIFIED POSTPROCEDURAL STATES: Chronic | ICD-10-CM

## 2025-05-07 PROCEDURE — 88344 IMHCHEM/IMCYTCHM EA MLT ANTB: CPT | Mod: 26

## 2025-05-07 PROCEDURE — 99222 1ST HOSP IP/OBS MODERATE 55: CPT

## 2025-05-07 PROCEDURE — 88305 TISSUE EXAM BY PATHOLOGIST: CPT | Mod: 26

## 2025-05-07 RX ORDER — ACETAMINOPHEN 500 MG/5ML
975 LIQUID (ML) ORAL EVERY 6 HOURS
Refills: 0 | Status: DISCONTINUED | OUTPATIENT
Start: 2025-05-07 | End: 2025-05-09

## 2025-05-07 RX ORDER — HYDROMORPHONE/SOD CHLOR,ISO/PF 2 MG/10 ML
0.5 SYRINGE (ML) INJECTION
Refills: 0 | Status: DISCONTINUED | OUTPATIENT
Start: 2025-05-07 | End: 2025-05-07

## 2025-05-07 RX ORDER — FINASTERIDE 1 MG/1
1 TABLET, FILM COATED ORAL
Refills: 0 | DISCHARGE

## 2025-05-07 RX ORDER — SODIUM CHLORIDE 9 G/1000ML
1000 INJECTION, SOLUTION INTRAVENOUS
Refills: 0 | Status: DISCONTINUED | OUTPATIENT
Start: 2025-05-07 | End: 2025-05-07

## 2025-05-07 RX ORDER — HYDROCORTISONE 10 MG/G
1 CREAM TOPICAL THREE TIMES A DAY
Refills: 0 | Status: DISCONTINUED | OUTPATIENT
Start: 2025-05-07 | End: 2025-05-09

## 2025-05-07 RX ORDER — ONDANSETRON HCL/PF 4 MG/2 ML
4 VIAL (ML) INJECTION EVERY 6 HOURS
Refills: 0 | Status: DISCONTINUED | OUTPATIENT
Start: 2025-05-07 | End: 2025-05-09

## 2025-05-07 RX ORDER — DIAZEPAM 2 MG/1
2 TABLET ORAL ONCE
Refills: 0 | Status: DISCONTINUED | OUTPATIENT
Start: 2025-05-07 | End: 2025-05-07

## 2025-05-07 RX ORDER — SODIUM CHLORIDE 9 G/1000ML
1000 INJECTION, SOLUTION INTRAVENOUS
Refills: 0 | Status: DISCONTINUED | OUTPATIENT
Start: 2025-05-07 | End: 2025-05-08

## 2025-05-07 RX ORDER — HYDROMORPHONE/SOD CHLOR,ISO/PF 2 MG/10 ML
1 SYRINGE (ML) INJECTION
Refills: 0 | Status: DISCONTINUED | OUTPATIENT
Start: 2025-05-07 | End: 2025-05-07

## 2025-05-07 RX ORDER — AMOXICILLIN AND CLAVULANATE POTASSIUM 500; 125 MG/1; MG/1
1 TABLET, FILM COATED ORAL
Refills: 0 | Status: DISCONTINUED | OUTPATIENT
Start: 2025-05-07 | End: 2025-05-09

## 2025-05-07 RX ORDER — SENNA 187 MG
1 TABLET ORAL AT BEDTIME
Refills: 0 | Status: DISCONTINUED | OUTPATIENT
Start: 2025-05-07 | End: 2025-05-09

## 2025-05-07 RX ORDER — IBUPROFEN 200 MG
600 TABLET ORAL EVERY 6 HOURS
Refills: 0 | Status: DISCONTINUED | OUTPATIENT
Start: 2025-05-07 | End: 2025-05-09

## 2025-05-07 RX ORDER — OXYBUTYNIN CHLORIDE 5 MG/1
5 TABLET, FILM COATED, EXTENDED RELEASE ORAL EVERY 8 HOURS
Refills: 0 | Status: DISCONTINUED | OUTPATIENT
Start: 2025-05-07 | End: 2025-05-09

## 2025-05-07 RX ORDER — ONDANSETRON HCL/PF 4 MG/2 ML
4 VIAL (ML) INJECTION ONCE
Refills: 0 | Status: COMPLETED | OUTPATIENT
Start: 2025-05-07 | End: 2025-05-07

## 2025-05-07 RX ORDER — HEPARIN SODIUM 1000 [USP'U]/ML
5000 INJECTION INTRAVENOUS; SUBCUTANEOUS EVERY 8 HOURS
Refills: 0 | Status: DISCONTINUED | OUTPATIENT
Start: 2025-05-07 | End: 2025-05-09

## 2025-05-07 RX ORDER — LIDOCAINE HYDROCHLORIDE 20 MG/ML
1 JELLY TOPICAL THREE TIMES A DAY
Refills: 0 | Status: DISCONTINUED | OUTPATIENT
Start: 2025-05-07 | End: 2025-05-07

## 2025-05-07 RX ORDER — LIDOCAINE HYDROCHLORIDE 20 MG/ML
1 JELLY TOPICAL THREE TIMES A DAY
Refills: 0 | Status: DISCONTINUED | OUTPATIENT
Start: 2025-05-07 | End: 2025-05-09

## 2025-05-07 RX ORDER — HYDROXYZINE HYDROCHLORIDE 25 MG/1
25 TABLET, FILM COATED ORAL AT BEDTIME
Refills: 0 | Status: DISCONTINUED | OUTPATIENT
Start: 2025-05-07 | End: 2025-05-08

## 2025-05-07 RX ADMIN — OXYBUTYNIN CHLORIDE 5 MILLIGRAM(S): 5 TABLET, FILM COATED, EXTENDED RELEASE ORAL at 11:07

## 2025-05-07 RX ADMIN — Medication 600 MILLIGRAM(S): at 11:40

## 2025-05-07 RX ADMIN — Medication 0.5 MILLIGRAM(S): at 10:15

## 2025-05-07 RX ADMIN — SODIUM CHLORIDE 125 MILLILITER(S): 9 INJECTION, SOLUTION INTRAVENOUS at 21:10

## 2025-05-07 RX ADMIN — Medication 0.5 MILLIGRAM(S): at 10:02

## 2025-05-07 RX ADMIN — SODIUM CHLORIDE 125 MILLILITER(S): 9 INJECTION, SOLUTION INTRAVENOUS at 10:03

## 2025-05-07 RX ADMIN — HEPARIN SODIUM 5000 UNIT(S): 1000 INJECTION INTRAVENOUS; SUBCUTANEOUS at 21:11

## 2025-05-07 RX ADMIN — DIAZEPAM 2 MILLIGRAM(S): 2 TABLET ORAL at 11:00

## 2025-05-07 RX ADMIN — Medication 975 MILLIGRAM(S): at 21:11

## 2025-05-07 RX ADMIN — Medication 975 MILLIGRAM(S): at 16:30

## 2025-05-07 RX ADMIN — LIDOCAINE HYDROCHLORIDE 1 APPLICATION(S): 20 JELLY TOPICAL at 11:04

## 2025-05-07 RX ADMIN — HEPARIN SODIUM 5000 UNIT(S): 1000 INJECTION INTRAVENOUS; SUBCUTANEOUS at 13:39

## 2025-05-07 RX ADMIN — Medication 975 MILLIGRAM(S): at 22:11

## 2025-05-07 RX ADMIN — Medication 600 MILLIGRAM(S): at 23:53

## 2025-05-07 RX ADMIN — Medication 600 MILLIGRAM(S): at 18:37

## 2025-05-07 RX ADMIN — AMOXICILLIN AND CLAVULANATE POTASSIUM 1 TABLET(S): 500; 125 TABLET, FILM COATED ORAL at 18:37

## 2025-05-07 RX ADMIN — Medication 975 MILLIGRAM(S): at 17:26

## 2025-05-07 RX ADMIN — Medication 1 TABLET(S): at 21:12

## 2025-05-07 RX ADMIN — HYDROXYZINE HYDROCHLORIDE 25 MILLIGRAM(S): 25 TABLET, FILM COATED ORAL at 21:12

## 2025-05-07 RX ADMIN — Medication 4 MILLIGRAM(S): at 10:36

## 2025-05-07 RX ADMIN — HYDROCORTISONE 1 APPLICATION(S): 10 CREAM TOPICAL at 10:30

## 2025-05-07 RX ADMIN — LIDOCAINE HYDROCHLORIDE 1 APPLICATION(S): 20 JELLY TOPICAL at 21:10

## 2025-05-07 NOTE — PATIENT PROFILE ADULT - FALL HARM RISK - RISK INTERVENTIONS
Assistance OOB with selected safe patient handling equipment/Assistance with ambulation/Communicate Fall Risk and Risk Factors to all staff, patient, and family/Monitor for mental status changes/Monitor gait and stability/Reinforce activity limits and safety measures with patient and family/Sit up slowly, dangle for a short time, stand at bedside before walking/Use of alarms - bed, chair and/or voice tab/Visual Cue: Yellow wristband/Bed in lowest position, wheels locked, appropriate side rails in place/Call bell, personal items and telephone in reach/Instruct patient to call for assistance before getting out of bed or chair/Non-slip footwear when patient is out of bed/[place] to call system/Physically safe environment - no spills, clutter or unnecessary equipment/Purposeful Proactive Rounding/Room/bathroom lighting operational, light cord in reach

## 2025-05-07 NOTE — CONSULT NOTE ADULT - PROBLEM SELECTOR RECOMMENDATION 2
Patient self reports history of anxiety  - notes he has been having difficulty sleeping recently  - notes he has tried trazodone, ambien and melatonin in the past with little effect  - can trial low dose atarax 25mg qhs

## 2025-05-07 NOTE — PATIENT PROFILE ADULT - FUNCTIONAL ASSESSMENT - DAILY ACTIVITY ASSESSMENT TYPE
33 yo woman who presents with visual field defect OD     Onset 10/30/24 following head trauma. She reports a superonasal shadow in the R eye that is constant and does not move location. She is not sure if it is improving. Denies any eye pain or headaches. No involvement OS. Seen by HD retina where HA reported normal. CT normal     Exam 11/14/24: No APD, VA 20/400 OD, 20/20 OS, 6/9 color plates OD 9/9 OS, generalized depression OD with some sparing of the inferonasal quadrant. EOM full, ortho alignment. R optic nerve appears pink with sharp disc margins. Mild temporal pallor OS. RNFL 100 OD 83 OS. Amsler grid demonstrates multiple paracentral scotomas, the largest of which is in the superonasal grid    Plan:   The R optic nerve appears healthy without evidence of RNFL thinning, however her afferent visual exam is decreased in the R eye. Pt is only 2 weeks out from the incident therefore it is possible she has a mild traumatic optic neuropathy OD that has not resulted in RNFL thinning yet. The lack of APD however is less convincing of an optic neuropathy. Will obtain a MRI orbits wwo and basic autoimmune/infectious work up given the optic nerve pallor noted incidentally in the L eye.     -MRI orbits wwo  -Labs: CNS demyelinating, ESR/CRP, MARVEL, TB, RPR, bartonella, Toxo, B1,B12, folate   -RTC in 4-6 months for repeat fields    Admission

## 2025-05-07 NOTE — CONSULT NOTE ADULT - ASSESSMENT
58 y/o M with history of BPH presenting to the hospital for planned TURP. Patient now s/p TURB on 5/7, admitted to urology service for post-operative management. Medicine consulted for medical comanagement.

## 2025-05-07 NOTE — CONSULT NOTE ADULT - PROBLEM SELECTOR RECOMMENDATION 9
Patient with history of BPH c/b urinary retention requiring catheterization  - now s/p TURP on 5/7  - post-operative management per primary urology team  - pain control, ADAT  - ambulate as tolerated  - TOV and antibiotics per primary team

## 2025-05-07 NOTE — CONSULT NOTE ADULT - SUBJECTIVE AND OBJECTIVE BOX
HPI: Mr. Richard is a 58 y/o M with history of BPH presenting to the hospital for planned TURP. Patient now s/p TURB on 5/7, admitted to urology service for post-operative management. Medicine consulted for medical comanagement.    Patient seen and examined at bedside. Reports that he was experiencing severe pain around his penis earlier but now her pain is significantly improved with some pain medications. Denies any abdominal pain. Denies any nausea, vomiting, CP or fever. Does note he has been having some difficulty sleeping at night due to anxiety.     PAST MEDICAL & SURGICAL HISTORY:  BPH (benign prostatic hyperplasia)  Cancer of prostate  Urine retention  Status post cryoablation    Review of Systems:   CONSTITUTIONAL: No fever, weight loss, or fatigue  HEENT: No eye pain, No sinus or throat pain  RESPIRATORY: No cough, wheezing, chills or hemoptysis; No shortness of breath  CARDIOVASCULAR: No chest pain, palpitations, dizziness, or leg swelling  GASTROINTESTINAL: No abdominal or epigastric pain. No nausea, vomiting, or hematemesis; No diarrhea or constipation.  GENITOURINARY: No dysuria, frequency  NEUROLOGICAL: No headaches, numbness, or tremors    Allergies  No Known Allergies  Intolerances    Social History:     FAMILY HISTORY:    MEDICATIONS  (STANDING):  acetaminophen     Tablet .. 975 milliGRAM(s) Oral every 6 hours  amoxicillin  875 milliGRAM(s)/clavulanate 1 Tablet(s) Oral two times a day  heparin   Injectable 5000 Unit(s) SubCutaneous every 8 hours  hydrocortisone 1% Cream 1 Application(s) Topical three times a day  ibuprofen  Tablet. 600 milliGRAM(s) Oral every 6 hours  lactated ringers. 1000 milliLiter(s) (125 mL/Hr) IV Continuous <Continuous>  lidocaine 5% Ointment 1 Application(s) Topical three times a day  senna 1 Tablet(s) Oral at bedtime    MEDICATIONS  (PRN):  ondansetron Injectable 4 milliGRAM(s) IV Push every 6 hours PRN Nausea and/or Vomiting  oxybutynin 5 milliGRAM(s) Oral every 8 hours PRN Bladder spasms    Vital Signs Last 24 Hrs  T(C): 36.5 (07 May 2025 13:25), Max: 36.5 (07 May 2025 13:25)  T(F): 97.7 (07 May 2025 13:25), Max: 97.7 (07 May 2025 13:25)  HR: 98 (07 May 2025 13:25) (78 - 98)  BP: 98/58 (07 May 2025 13:25) (98/58 - 125/78)  BP(mean): 87 (07 May 2025 12:00) (85 - 98)  RR: 18 (07 May 2025 13:25) (12 - 20)  SpO2: 99% (07 May 2025 13:25) (99% - 100%)    Parameters below as of 07 May 2025 13:25  Patient On (Oxygen Delivery Method): room air    CAPILLARY BLOOD GLUCOSE    PHYSICAL EXAM:  GENERAL: NAD, well-developed  EYES: eyes tracking  CHEST/LUNG: Clear to auscultation bilaterally; No wheezes  HEART: Regular rate and rhythm; No murmurs, rubs, or gallops  ABDOMEN: Soft, non tender, Nondistended  : espinoza catheter in place, clearing clear urine  EXTREMITIES: no LE edema  NEUROLOGY: non-focal    LABS:    EKG(personally reviewed):    RADIOLOGY & ADDITIONAL TESTS:    Imaging Personally Reviewed:    Consultant(s) Notes Reviewed:      Care Discussed with Consultants/Other Providers: Y, discussed with primary urology team

## 2025-05-07 NOTE — CONSULT NOTE ADULT - TIME BILLING
Preparing to see the patient including review of tests and other providers' notes, performing medical examination and evaluation, counseling and educating the patient/family, ordering medications, tests and procedures, communicating with other health care professionals, documenting clinical information in the EMR, independently interpreting results and communicating results to the patient. no

## 2025-05-08 PROCEDURE — 99232 SBSQ HOSP IP/OBS MODERATE 35: CPT

## 2025-05-08 RX ORDER — DIAZEPAM 2 MG/1
2 TABLET ORAL EVERY 6 HOURS
Refills: 0 | Status: DISCONTINUED | OUTPATIENT
Start: 2025-05-08 | End: 2025-05-09

## 2025-05-08 RX ORDER — DIAZEPAM 2 MG/1
2 TABLET ORAL ONCE
Refills: 0 | Status: DISCONTINUED | OUTPATIENT
Start: 2025-05-08 | End: 2025-05-08

## 2025-05-08 RX ORDER — HYDROXYZINE HYDROCHLORIDE 25 MG/1
12.5 TABLET, FILM COATED ORAL AT BEDTIME
Refills: 0 | Status: DISCONTINUED | OUTPATIENT
Start: 2025-05-08 | End: 2025-05-09

## 2025-05-08 RX ORDER — POLYETHYLENE GLYCOL 3350 17 G/17G
17 POWDER, FOR SOLUTION ORAL DAILY
Refills: 0 | Status: DISCONTINUED | OUTPATIENT
Start: 2025-05-08 | End: 2025-05-09

## 2025-05-08 RX ORDER — SODIUM CHLORIDE 9 G/1000ML
1000 INJECTION, SOLUTION INTRAVENOUS
Refills: 0 | Status: DISCONTINUED | OUTPATIENT
Start: 2025-05-08 | End: 2025-05-09

## 2025-05-08 RX ADMIN — Medication 600 MILLIGRAM(S): at 14:16

## 2025-05-08 RX ADMIN — HYDROXYZINE HYDROCHLORIDE 12.5 MILLIGRAM(S): 25 TABLET, FILM COATED ORAL at 22:02

## 2025-05-08 RX ADMIN — SODIUM CHLORIDE 125 MILLILITER(S): 9 INJECTION, SOLUTION INTRAVENOUS at 05:07

## 2025-05-08 RX ADMIN — HYDROCORTISONE 1 APPLICATION(S): 10 CREAM TOPICAL at 05:40

## 2025-05-08 RX ADMIN — LIDOCAINE HYDROCHLORIDE 1 APPLICATION(S): 20 JELLY TOPICAL at 05:40

## 2025-05-08 RX ADMIN — Medication 975 MILLIGRAM(S): at 23:01

## 2025-05-08 RX ADMIN — DIAZEPAM 2 MILLIGRAM(S): 2 TABLET ORAL at 22:02

## 2025-05-08 RX ADMIN — Medication 975 MILLIGRAM(S): at 06:40

## 2025-05-08 RX ADMIN — Medication 975 MILLIGRAM(S): at 05:43

## 2025-05-08 RX ADMIN — Medication 975 MILLIGRAM(S): at 18:35

## 2025-05-08 RX ADMIN — HEPARIN SODIUM 5000 UNIT(S): 1000 INJECTION INTRAVENOUS; SUBCUTANEOUS at 14:04

## 2025-05-08 RX ADMIN — DIAZEPAM 2 MILLIGRAM(S): 2 TABLET ORAL at 09:29

## 2025-05-08 RX ADMIN — HYDROCORTISONE 1 APPLICATION(S): 10 CREAM TOPICAL at 13:52

## 2025-05-08 RX ADMIN — POLYETHYLENE GLYCOL 3350 17 GRAM(S): 17 POWDER, FOR SOLUTION ORAL at 13:49

## 2025-05-08 RX ADMIN — HEPARIN SODIUM 5000 UNIT(S): 1000 INJECTION INTRAVENOUS; SUBCUTANEOUS at 22:02

## 2025-05-08 RX ADMIN — Medication 975 MILLIGRAM(S): at 12:41

## 2025-05-08 RX ADMIN — Medication 600 MILLIGRAM(S): at 13:49

## 2025-05-08 RX ADMIN — Medication 600 MILLIGRAM(S): at 18:36

## 2025-05-08 RX ADMIN — HEPARIN SODIUM 5000 UNIT(S): 1000 INJECTION INTRAVENOUS; SUBCUTANEOUS at 05:42

## 2025-05-08 RX ADMIN — Medication 600 MILLIGRAM(S): at 00:50

## 2025-05-08 RX ADMIN — Medication 975 MILLIGRAM(S): at 17:23

## 2025-05-08 RX ADMIN — Medication 600 MILLIGRAM(S): at 06:30

## 2025-05-08 RX ADMIN — Medication 600 MILLIGRAM(S): at 05:42

## 2025-05-08 RX ADMIN — Medication 600 MILLIGRAM(S): at 18:47

## 2025-05-08 RX ADMIN — LIDOCAINE HYDROCHLORIDE 1 APPLICATION(S): 20 JELLY TOPICAL at 13:52

## 2025-05-08 RX ADMIN — LIDOCAINE HYDROCHLORIDE 1 APPLICATION(S): 20 JELLY TOPICAL at 22:02

## 2025-05-08 RX ADMIN — DIAZEPAM 2 MILLIGRAM(S): 2 TABLET ORAL at 14:04

## 2025-05-08 RX ADMIN — Medication 975 MILLIGRAM(S): at 11:02

## 2025-05-08 RX ADMIN — Medication 975 MILLIGRAM(S): at 22:02

## 2025-05-08 RX ADMIN — AMOXICILLIN AND CLAVULANATE POTASSIUM 1 TABLET(S): 500; 125 TABLET, FILM COATED ORAL at 05:43

## 2025-05-08 RX ADMIN — SODIUM CHLORIDE 75 MILLILITER(S): 9 INJECTION, SOLUTION INTRAVENOUS at 07:27

## 2025-05-08 RX ADMIN — AMOXICILLIN AND CLAVULANATE POTASSIUM 1 TABLET(S): 500; 125 TABLET, FILM COATED ORAL at 17:23

## 2025-05-08 NOTE — DIETITIAN NUTRITION RISK NOTIFICATION - ADDITIONAL COMMENTS/DIETITIAN RECOMMENDATIONS
Please see Dietitian Initial Assessment for complete recommendations.  Rajwinder Botello MS, RD, CDN, CNSC (pg #[phone])

## 2025-05-08 NOTE — DIETITIAN INITIAL EVALUATION ADULT - REASON FOR ADMISSION
58 y/o M with history of BPH presenting to the hospital for planned TURP. Patient now s/p TURB on 5/7, admitted to urology service for post-operative management. Medicine consulted for medical comanagement per chart

## 2025-05-08 NOTE — DIETITIAN INITIAL EVALUATION ADULT - PERTINENT MEDS FT
MEDICATIONS  (STANDING):  acetaminophen     Tablet .. 975 milliGRAM(s) Oral every 6 hours  amoxicillin  875 milliGRAM(s)/clavulanate 1 Tablet(s) Oral two times a day  dextrose 5% + sodium chloride 0.45%. 1000 milliLiter(s) (75 mL/Hr) IV Continuous <Continuous>  heparin   Injectable 5000 Unit(s) SubCutaneous every 8 hours  hydrocortisone 1% Cream 1 Application(s) Topical three times a day  hydrOXYzine hydrochloride 25 milliGRAM(s) Oral at bedtime  ibuprofen  Tablet. 600 milliGRAM(s) Oral every 6 hours  lidocaine 5% Ointment 1 Application(s) Topical three times a day  polyethylene glycol 3350 17 Gram(s) Oral daily  senna 1 Tablet(s) Oral at bedtime    MEDICATIONS  (PRN):  ondansetron Injectable 4 milliGRAM(s) IV Push every 6 hours PRN Nausea and/or Vomiting  oxybutynin 5 milliGRAM(s) Oral every 8 hours PRN Bladder spasms

## 2025-05-08 NOTE — PROGRESS NOTE ADULT - PROBLEM SELECTOR PLAN 2
Patient self reports history of anxiety  - notes he has been having difficulty sleeping recently  - notes he has tried trazodone, ambien and melatonin in the past with little effect  - noted with good therapeutic effect with atarax 25mg, can trial lower dose of 12.5mg qhs to further reduce risk of possible side effects

## 2025-05-08 NOTE — DIETITIAN INITIAL EVALUATION ADULT - OTHER INFO
Pt observed having lunch meal in progress. Reports he typically finishes 50% of the meals; reports nausea on and off pt is ordered for zofran. Believes last BM was 4-5 days ago (ordered for miralax and senna). No noted BMs per RN flowsheet documentation. Per nutrition focused physical findings, pt observed with mild-moderate muscle and fat depletions meeting criteria for malnutrition. Reviewed nutritional recommendations to promote adequate protein calorie intake and suggestions to help manage symptoms of poor appetite and nausea pt nodded in agreement to suggestions however offer fir oral nutrition supplement to support inhouse intake declined by patient, stated " I don't feel like having anything like that right now". Pt informed nutrition remains available for further concerns encouraged to ask to speak with dietitian as needed. Nutrition to follow.    Weight hx limited, last HIE wt available noted 74.4 (4/29/25) admit wt noted 74.5 (5/7/24) no overt change x1 month.

## 2025-05-08 NOTE — DIETITIAN INITIAL EVALUATION ADULT - ADD RECOMMEND
Monitor weights, labs, BM's, skin integrity, p.o. intake.   Defer fluid management to medical team   Please monitor % PO intake on flowsheets  Continue antiemetics and bowel regimen; monitor bowel function   to continue to honor food preferences to promote po intake

## 2025-05-08 NOTE — CHART NOTE - NSCHARTNOTEFT_GEN_A_CORE
Gleason removed this AM, pt voided bloody urine and then c/o retention  Bladder scan 350  22 fr 3 way placed easily in normal fashion, irrigated, no clots to light pink  Will start CBI and re-evaluate color  Possible repeat TOV in AM

## 2025-05-08 NOTE — DIETITIAN INITIAL EVALUATION ADULT - ORAL INTAKE PTA/DIET HISTORY
Per patient usual intake reported to be 3 daily meals with good intake however reports due to issue with his prostate, he has been having intermittent nausea for a very long time, when asked to clarify if his intake still remained good he reported somewhat decreased intake. NKFA. Lives with spouse.  Believes wt has stayed the same 164 lbs for the past year.

## 2025-05-09 ENCOUNTER — TRANSCRIPTION ENCOUNTER (OUTPATIENT)
Age: 60
End: 2025-05-09

## 2025-05-09 VITALS
TEMPERATURE: 99 F | HEART RATE: 82 BPM | SYSTOLIC BLOOD PRESSURE: 119 MMHG | OXYGEN SATURATION: 95 % | RESPIRATION RATE: 18 BRPM | DIASTOLIC BLOOD PRESSURE: 74 MMHG

## 2025-05-09 PROBLEM — N40.0 BENIGN PROSTATIC HYPERPLASIA WITHOUT LOWER URINARY TRACT SYMPTOMS: Chronic | Status: ACTIVE | Noted: 2025-05-02

## 2025-05-09 PROCEDURE — 99232 SBSQ HOSP IP/OBS MODERATE 35: CPT

## 2025-05-09 RX ORDER — ACETAMINOPHEN 500 MG/5ML
3 LIQUID (ML) ORAL
Qty: 0 | Refills: 0 | DISCHARGE
Start: 2025-05-09

## 2025-05-09 RX ORDER — POLYETHYLENE GLYCOL 3350 17 G/17G
17 POWDER, FOR SOLUTION ORAL
Qty: 0 | Refills: 0 | DISCHARGE
Start: 2025-05-09

## 2025-05-09 RX ORDER — SENNA 187 MG
1 TABLET ORAL
Qty: 0 | Refills: 0 | DISCHARGE
Start: 2025-05-09

## 2025-05-09 RX ADMIN — HEPARIN SODIUM 5000 UNIT(S): 1000 INJECTION INTRAVENOUS; SUBCUTANEOUS at 13:28

## 2025-05-09 RX ADMIN — Medication 975 MILLIGRAM(S): at 05:57

## 2025-05-09 RX ADMIN — AMOXICILLIN AND CLAVULANATE POTASSIUM 1 TABLET(S): 500; 125 TABLET, FILM COATED ORAL at 05:57

## 2025-05-09 RX ADMIN — LIDOCAINE HYDROCHLORIDE 1 APPLICATION(S): 20 JELLY TOPICAL at 05:58

## 2025-05-09 RX ADMIN — POLYETHYLENE GLYCOL 3350 17 GRAM(S): 17 POWDER, FOR SOLUTION ORAL at 13:28

## 2025-05-09 RX ADMIN — Medication 1 LOZENGE: at 14:20

## 2025-05-09 RX ADMIN — Medication 1 LOZENGE: at 06:00

## 2025-05-09 RX ADMIN — HEPARIN SODIUM 5000 UNIT(S): 1000 INJECTION INTRAVENOUS; SUBCUTANEOUS at 05:57

## 2025-05-09 RX ADMIN — Medication 975 MILLIGRAM(S): at 06:50

## 2025-05-09 NOTE — DISCHARGE NOTE PROVIDER - CARE PROVIDERS DIRECT ADDRESSES
[email or web address],DirectAddress_[email or web address]

## 2025-05-09 NOTE — PROGRESS NOTE ADULT - ASSESSMENT
58 yo M s/p TURP 5/7/2025 5/8: CBI clamped and urine remained clear, pt offers no complaints    Plan:  -IVM  -d/c espinoza, check PVR, monitor UO/color  -DVT prophy, IS, OOB, ambulate  -d/c home later today to continue abx he has at home  
60 y/o M with history of BPH presenting to the hospital for planned TURP. Patient now s/p TURB on 5/7, admitted to urology service for post-operative management. Medicine consulted for medical comanagement.
58 y/o M with history of BPH presenting to the hospital for planned TURP. Patient now s/p TURB on 5/7, admitted to urology service for post-operative management. Medicine consulted for medical comanagement.
58 yo M s/p TURP 5/7/2025 5/8: CBI clamped and urine remained clear, pt offers no complaints, failed TOV, espinoza replaced and CBI restarted  5/9: CBI clamped earlier this AM, urine clear, pt wants to think about TOV, will re-evaluate later this AM, abdomen soft    Plan:  -IVM  -if pt decides, d/c espinoza, check PVR, monitor UO/color  -DVT prophy, IS, OOB, ambulate  -d/c home later today to continue abx he has at home  
59 M s/p SUNSHINE

## 2025-05-09 NOTE — PROGRESS NOTE ADULT - TIME BILLING
Preparing to see the patient including review of tests and other providers' notes, confirming history with family members, performing medical examination and evaluation, counseling and educating the patient/family, ordering medications, tests and procedures, communicating with other health care professionals, documenting clinical information in the EMR, independently interpreting results and communicating results to the patient/family, as well as care coordination.
Preparing to see the patient including review of tests and other providers' notes, performing medical examination and evaluation, counseling and educating the patient/family, ordering medications, tests and procedures, communicating with other health care professionals, documenting clinical information in the EMR, independently interpreting results and communicating results to the patient.

## 2025-05-09 NOTE — DISCHARGE NOTE NURSING/CASE MANAGEMENT/SOCIAL WORK - NSDCPECAREGIVERED_GEN_ALL_CORE
Medline and carenotes for Urinary retention, hematuria, Valium, as well as DC Medications and side effects literature for patient reference.

## 2025-05-09 NOTE — PROGRESS NOTE ADULT - PROBLEM SELECTOR PLAN 1
Patient with history of BPH c/b urinary retention requiring catheterization  - now s/p TURP on 5/7  - post-operative management per primary urology team  - pain control, ADAT  - ambulate as tolerated  - CBI now completed, TOV in progress
Strict I&O's / CBI  Analgesia Antiemetics  DVT prophylaxis  Incentive spirometry  Reg / OOB  Clamp trial in AM
Patient with history of BPH c/b urinary retention requiring catheterization  - now s/p TURP on 5/7  - post-operative management per primary urology team  - pain control, ADAT  - ambulate as tolerated  - c/f some hematuria this AM, plan for brief course of CBI

## 2025-05-09 NOTE — DISCHARGE NOTE NURSING/CASE MANAGEMENT/SOCIAL WORK - NSDCPEFALRISK_GEN_ALL_CORE
ACM attempted 2nd outreach to reach patient for Maternity CM initial outreach. HIPAA compliant message left requesting a return phone call at patients convenience. Unable to Reach Letter sent to patient via [employer]. Will continue to outreach patient. INEZ Garza, RN  Associate Care Manager   Cell: [phone]  [email or web address]
For information on Fall & Injury Prevention, visit: [email or web address] OR  [email or web address] OR  https://www.cdc.gov/steadi/patient.html

## 2025-05-09 NOTE — DISCHARGE NOTE NURSING/CASE MANAGEMENT/SOCIAL WORK - NSDCPNINST_GEN_ALL_CORE
Call MD with increased unrelieved bladder discomfort and signs of urine retention, inability to urinate, increased active bleeding/blood clots present in urine.   Continue to drink plenty of fluids and avoid straining as well as constipation which may be a side effect from taking certain meds. Follow-up with MD in office for post-op check as well as with PMD as advised for continuity of care.

## 2025-05-09 NOTE — DISCHARGE NOTE PROVIDER - NSDCCPCAREPLAN_GEN_ALL_CORE_FT
PRINCIPAL DISCHARGE DIAGNOSIS  Diagnosis: BPH (benign prostatic hyperplasia)  Assessment and Plan of Treatment: Keep well hydrated.  No heavy lifting or straining for 4 to 6 weeks, avoid constipation. You may have intermittent pink tinged urine and urinary dribbling.  This is normal.   If your urine becomes bright red or with clots, please call the office.  Finish the antibiotics you have at home.  Dr. Capps's office will call you with a follow up appointment.  Call the office if you have fever greater than 101, difficulty urinating, pain not relieved with pain medication, nausea/vomiting.

## 2025-05-09 NOTE — DISCHARGE NOTE PROVIDER - NSDCMRMEDTOKEN_GEN_ALL_CORE_FT
acetaminophen 325 mg oral tablet: 3 tab(s) orally every 6 hours as needed for pain  finasteride 5 mg oral tablet: 1 tab(s) orally once a day (at bedtime)  Flomax 0.4 mg oral capsule: 1 cap(s) orally 2 times a day  polyethylene glycol 3350 oral powder for reconstitution: 17 gram(s) orally once a day  senna leaf extract oral tablet: 1 tab(s) orally once a day (at bedtime) as needed for  constipation

## 2025-05-09 NOTE — PROGRESS NOTE ADULT - PROBLEM SELECTOR PLAN 2
Patient self reports history of anxiety  - notes he has been having difficulty sleeping recently  - notes he has tried trazodone, ambien and melatonin in the past with little effect  - c/w atarax 12.5mg qhs if needed for anxiety/insomnia

## 2025-05-09 NOTE — DISCHARGE NOTE NURSING/CASE MANAGEMENT/SOCIAL WORK - PATIENT PORTAL LINK FT
You can access the FollowMyHealth Patient Portal offered by [facility] by registering at the following website: [email or web address]. By joining [facility]’s FollowMyHealth portal, you will also be able to view your health information using other applications (apps) compatible with our system.

## 2025-05-09 NOTE — PROGRESS NOTE ADULT - SUBJECTIVE AND OBJECTIVE BOX
Overnight events:  None, CBI clamped earlier this AM    Subjective:  Pt offers no complaints, wants to think about TOV for a little longer this AM    Objective:    Vital signs  T(C): , Max: 36.7 (05-08-25 @ 13:44)  HR: 85 (05-09-25 @ 06:09)  BP: 120/85 (05-09-25 @ 06:09)  SpO2: 99% (05-09-25 @ 06:09)  Wt(kg): --    Output   Alexis: clear  05-08 @ 07:01  -  05-09 @ 07:00  --------------------------------------------------------  IN: 750 mL / OUT: 200 mL / NET: 550 mL        Gen: NAD  Abd: soft, nontender  : alexis secured    Labs: none today            
Overnight events:  None, CBI clamped earlier this AM    Subjective:  Pt offers no complaints    Objective:    Vital signs  T(C): , Max: 37.2 (05-07-25 @ 17:39)  HR: 78 (05-08-25 @ 05:40)  BP: 106/72 (05-08-25 @ 05:40)  SpO2: 98% (05-08-25 @ 05:40)  Wt(kg): --    Output   CBI: clear  05-07 @ 07:01  -  05-08 @ 07:00  --------------------------------------------------------  IN: 325 mL / OUT: 175 mL / NET: 150 mL        Gen: NAD  Abd: soft, nontender  : alexis removed on rounds    Labs: none today            
[facility] Department of Hospital Medicine  Angus Elam MD  Available on MS Teams  Pager: [phone]    Patient is a 59y old  Male who presents with a chief complaint of BPH with urinary retention (07 May 2025 15:12)    OVERNIGHT EVENTS: No acute events overnight.    SUBJECTIVE: Pt seen and examined at bedside this morning. Notes mild penile/suprapubic discomfort. States he was otherwise able to sleep well last night. Denies any CP, SOB, nausea or vomiting.    ADDITIONAL REVIEW OF SYSTEMS: as above.    MEDICATIONS  (STANDING):  acetaminophen     Tablet .. 975 milliGRAM(s) Oral every 6 hours  amoxicillin  875 milliGRAM(s)/clavulanate 1 Tablet(s) Oral two times a day  dextrose 5% + sodium chloride 0.45%. 1000 milliLiter(s) (75 mL/Hr) IV Continuous <Continuous>  heparin   Injectable 5000 Unit(s) SubCutaneous every 8 hours  hydrocortisone 1% Cream 1 Application(s) Topical three times a day  hydrOXYzine hydrochloride 25 milliGRAM(s) Oral at bedtime  ibuprofen  Tablet. 600 milliGRAM(s) Oral every 6 hours  lidocaine 5% Ointment 1 Application(s) Topical three times a day  polyethylene glycol 3350 17 Gram(s) Oral daily  senna 1 Tablet(s) Oral at bedtime    MEDICATIONS  (PRN):  ondansetron Injectable 4 milliGRAM(s) IV Push every 6 hours PRN Nausea and/or Vomiting  oxybutynin 5 milliGRAM(s) Oral every 8 hours PRN Bladder spasms    CAPILLARY BLOOD GLUCOSE    I&O's Summary    07 May 2025 07:01  -  08 May 2025 07:00  --------------------------------------------------------  IN: 325 mL / OUT: 175 mL / NET: 150 mL    PHYSICAL EXAM:  Vital Signs Last 24 Hrs  T(C): 36.6 (08 May 2025 09:55), Max: 37.2 (07 May 2025 17:39)  T(F): 97.9 (08 May 2025 09:55), Max: 99 (07 May 2025 17:39)  HR: 78 (08 May 2025 09:55) (70 - 101)  BP: 127/77 (08 May 2025 09:55) (98/58 - 127/77)  BP(mean): 87 (07 May 2025 12:00) (87 - 94)  RR: 17 (08 May 2025 09:55) (12 - 20)  SpO2: 97% (08 May 2025 09:55) (95% - 100%)    Parameters below as of 08 May 2025 09:55  Patient On (Oxygen Delivery Method): room air    PHYSICAL EXAM:  GENERAL: NAD, well-developed  EYES: eyes tracking  CHEST/LUNG: Clear to auscultation bilaterally; No wheezes  HEART: Regular rate and rhythm; No murmurs, rubs, or gallops  ABDOMEN: Soft, non tender, Nondistended  : espinoza catheter in place, reddish urine noted  EXTREMITIES: no LE edema  NEUROLOGY: non-focal    LABS:    RADIOLOGY & ADDITIONAL TESTS:    Results Reviewed:   Imaging Personally Reviewed:  Electrocardiogram Personally Reviewed:    COORDINATION OF CARE:  Care Discussed with Consultants/Other Providers [Y/N]: Y, discussed with urology team  Prior or Outpatient Records Reviewed [Y/N]:  
 Note    Post op Check    s/p : TURP    Pt seen / examined without complaints pain controlled    Vital Signs Last 24 Hrs  T(C): 36.5 (07 May 2025 13:25), Max: 36.5 (07 May 2025 13:25)  T(F): 97.7 (07 May 2025 13:25), Max: 97.7 (07 May 2025 13:25)  HR: 98 (07 May 2025 13:25) (78 - 98)  BP: 98/58 (07 May 2025 13:25) (98/58 - 125/78)  BP(mean): 87 (07 May 2025 12:00) (85 - 98)  RR: 18 (07 May 2025 13:25) (12 - 20)  SpO2: 99% (07 May 2025 13:25) (99% - 100%)    Parameters below as of 07 May 2025 13:25  Patient On (Oxygen Delivery Method): room air        I&O's Summary    07 May 2025 07:01  -  07 May 2025 13:32  --------------------------------------------------------  IN: 325 mL / OUT: 0 mL / NET: 325 mL    EBL=30cc    PHYSICAL EXAM:       Constitutional: awake alert NAD    Respiratory: no resp distress    Cardiovascular: RR    Gastrointestinal: soft NT ND    Genitourinary: espinoza in place CBI in progress. yellow urine    Extremities: + venodynes                                      
[facility] Department of Hospital Medicine  Angus Elam MD  Available on MS Teams  Pager: [phone]    Patient is a 59y old  Male who presents with a chief complaint of 58 y/o M with history of BPH presenting to the hospital for planned TURP. Patient now s/p TURB on 5/7, admitted to urology service for post-operative management. Medicine consulted for medical comanagement per chart (08 May 2025 11:52)    OVERNIGHT EVENTS: No acute events overnight.    SUBJECTIVE: Pt seen and examined at bedside this morning. Noted to be ambulating in the hallway with his wife. Patient reports feeling well. Notes some anxiety about having urinary retention but otherwise has been able to void small amounts thus far. Denies any abdominal pain, nausea or vomiting. Ambulating without issue. Tolerating diet.    ADDITIONAL REVIEW OF SYSTEMS: as above.    MEDICATIONS  (STANDING):  acetaminophen     Tablet .. 975 milliGRAM(s) Oral every 6 hours  amoxicillin  875 milliGRAM(s)/clavulanate 1 Tablet(s) Oral two times a day  dextrose 5% + sodium chloride 0.45%. 1000 milliLiter(s) (75 mL/Hr) IV Continuous <Continuous>  heparin   Injectable 5000 Unit(s) SubCutaneous every 8 hours  hydrocortisone 1% Cream 1 Application(s) Topical three times a day  hydrOXYzine hydrochloride 12.5 milliGRAM(s) Oral at bedtime  ibuprofen  Tablet. 600 milliGRAM(s) Oral every 6 hours  lidocaine 5% Ointment 1 Application(s) Topical three times a day  polyethylene glycol 3350 17 Gram(s) Oral daily  senna 1 Tablet(s) Oral at bedtime    MEDICATIONS  (PRN):  diazepam    Tablet 2 milliGRAM(s) Oral every 6 hours PRN bladder spasms  ondansetron Injectable 4 milliGRAM(s) IV Push every 6 hours PRN Nausea and/or Vomiting  oxybutynin 5 milliGRAM(s) Oral every 8 hours PRN Bladder spasms    CAPILLARY BLOOD GLUCOSE    I&O's Summary    08 May 2025 07:01  -  09 May 2025 07:00  --------------------------------------------------------  IN: 750 mL / OUT: 200 mL / NET: 550 mL    09 May 2025 07:01  -  09 May 2025 11:48  --------------------------------------------------------  IN: 0 mL / OUT: 400 mL / NET: -400 mL    PHYSICAL EXAM:  Vital Signs Last 24 Hrs  T(C): 36.7 (09 May 2025 10:02), Max: 36.7 (08 May 2025 13:44)  T(F): 98.1 (09 May 2025 10:02), Max: 98.1 (09 May 2025 10:02)  HR: 80 (09 May 2025 10:02) (72 - 86)  BP: 125/78 (09 May 2025 10:02) (100/59 - 127/76)  BP(mean): --  RR: 17 (09 May 2025 10:02) (16 - 17)  SpO2: 97% (09 May 2025 10:02) (97% - 100%)    Parameters below as of 09 May 2025 10:02  Patient On (Oxygen Delivery Method): room air    GENERAL: NAD, well-developed  EYES: eyes tracking  CHEST/LUNG: Clear to auscultation bilaterally; No wheezes  HEART: Regular rate and rhythm; No murmurs, rubs, or gallops  ABDOMEN: Soft, non tender, Nondistended  EXTREMITIES: no LE edema  NEUROLOGY: non-focal, ambulating without issue    LABS:    RADIOLOGY & ADDITIONAL TESTS:    Results Reviewed:   Imaging Personally Reviewed:  Electrocardiogram Personally Reviewed:    COORDINATION OF CARE:  Care Discussed with Consultants/Other Providers [Y/N]: Y, discussed with urology team  Prior or Outpatient Records Reviewed [Y/N]:

## 2025-05-09 NOTE — DISCHARGE NOTE NURSING/CASE MANAGEMENT/SOCIAL WORK - FINANCIAL ASSISTANCE
[facility] provides services at a reduced cost to those who are determined to be eligible through [facility]’s financial assistance program. Information regarding [facility]’s financial assistance program can be found by going to [email or web address] or by calling [phone].

## 2025-05-09 NOTE — DISCHARGE NOTE PROVIDER - HOSPITAL COURSE
60 yo M s/p TURP 5/7/2025 5/8: CBI clamped and urine remained clear, pt offers no complaints, failed TOV, espinoza replaced and CBI restarted  5/9: CBI clamped earlier this AM, urine clear, successful TOV with light pink urine, pt d/c to f/u with Dr. Capps to continue the abx he has at home.

## 2025-05-09 NOTE — DISCHARGE NOTE PROVIDER - CARE PROVIDER_API CALL
Harjinder Capps)  Urology  [address] [place] [address], [address]  [place] [address]  Phone: [phone]  Fax: [phone]  Follow Up Time:     JULES CORTES  Phone: [phone]  Fax: [phone]  Follow Up Time:

## 2025-05-09 NOTE — DISCHARGE NOTE PROVIDER - DETAILS OF MALNUTRITION DIAGNOSIS/DIAGNOSES
This patient has been assessed with a concern for Malnutrition and was treated during this hospitalization for the following Nutrition diagnosis/diagnoses:     -  05/08/2025: Moderate protein-calorie malnutrition

## 2025-05-10 PROBLEM — R33.9 RETENTION OF URINE, UNSPECIFIED: Chronic | Status: ACTIVE | Noted: 2025-05-02

## 2025-05-16 LAB — SURGICAL PATHOLOGY STUDY: SIGNIFICANT CHANGE UP

## 2025-05-19 PROBLEM — Z87.898 HISTORY OF URINARY RETENTION: Status: RESOLVED | Noted: 2021-09-30 | Resolved: 2025-05-19

## 2025-05-20 ENCOUNTER — APPOINTMENT (OUTPATIENT)
Dept: UROLOGY | Facility: CLINIC | Age: 60
End: 2025-05-20
Payer: COMMERCIAL

## 2025-05-20 DIAGNOSIS — N13.8 BENIGN PROSTATIC HYPERPLASIA WITH LOWER URINARY TRACT SYMPMS: ICD-10-CM

## 2025-05-20 DIAGNOSIS — N40.1 BENIGN PROSTATIC HYPERPLASIA WITH LOWER URINARY TRACT SYMPMS: ICD-10-CM

## 2025-05-20 DIAGNOSIS — Z87.898 PERSONAL HISTORY OF OTHER SPECIFIED CONDITIONS: ICD-10-CM

## 2025-05-20 DIAGNOSIS — C61 MALIGNANT NEOPLASM OF PROSTATE: ICD-10-CM

## 2025-05-20 PROCEDURE — 99024 POSTOP FOLLOW-UP VISIT: CPT

## 2025-05-21 LAB
APPEARANCE: ABNORMAL
BACTERIA: NEGATIVE /HPF
BILIRUBIN URINE: ABNORMAL
BLOOD URINE: ABNORMAL
CAST: 5 /LPF
COLOR: ABNORMAL
EPITHELIAL CELLS: 0 /HPF
GLUCOSE QUALITATIVE U: NEGATIVE MG/DL
HYALINE CASTS: PRESENT
KETONES URINE: ABNORMAL MG/DL
LEUKOCYTE ESTERASE URINE: ABNORMAL
MICROSCOPIC-UA: NORMAL
MUCUS: PRESENT
NITRITE URINE: NEGATIVE
PH URINE: 6
PROTEIN URINE: 300 MG/DL
RED BLOOD CELLS URINE: 1833 /HPF
REVIEW: NORMAL
SPECIFIC GRAVITY URINE: 1.02
UROBILINOGEN URINE: 1 MG/DL
WHITE BLOOD CELLS URINE: 140 /HPF

## 2025-05-22 LAB — BACTERIA UR CULT: NORMAL

## 2025-05-29 ENCOUNTER — NON-APPOINTMENT (OUTPATIENT)
Age: 60
End: 2025-05-29

## 2025-06-12 ENCOUNTER — TRANSCRIPTION ENCOUNTER (OUTPATIENT)
Age: 60
End: 2025-06-12

## 2025-06-12 RX ORDER — FINASTERIDE 5 MG/1
5 TABLET, FILM COATED ORAL
Qty: 90 | Refills: 0 | Status: ACTIVE | COMMUNITY
Start: 2025-06-06 | End: 1900-01-01

## 2025-06-23 ENCOUNTER — NON-APPOINTMENT (OUTPATIENT)
Age: 60
End: 2025-06-23

## 2025-07-01 ENCOUNTER — TRANSCRIPTION ENCOUNTER (OUTPATIENT)
Age: 60
End: 2025-07-01

## 2025-07-29 ENCOUNTER — APPOINTMENT (OUTPATIENT)
Dept: UROLOGY | Facility: CLINIC | Age: 60
End: 2025-07-29
Payer: COMMERCIAL

## 2025-07-29 VITALS
OXYGEN SATURATION: 95 % | WEIGHT: 166 LBS | BODY MASS INDEX: 22.48 KG/M2 | HEIGHT: 72 IN | SYSTOLIC BLOOD PRESSURE: 106 MMHG | DIASTOLIC BLOOD PRESSURE: 70 MMHG | HEART RATE: 66 BPM

## 2025-07-29 DIAGNOSIS — N52.37 ERECTILE DYSFUNCTION FOLLOWING PROSTATE ABLATIVE THERAPY: ICD-10-CM
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
## 2025-07-29 DIAGNOSIS — C61 MALIGNANT NEOPLASM OF PROSTATE: ICD-10-CM

## 2025-07-29 DIAGNOSIS — N40.1 BENIGN PROSTATIC HYPERPLASIA WITH LOWER URINARY TRACT SYMPMS: ICD-10-CM

## 2025-07-29 DIAGNOSIS — N13.8 BENIGN PROSTATIC HYPERPLASIA WITH LOWER URINARY TRACT SYMPMS: ICD-10-CM

## 2025-07-29 DIAGNOSIS — F41.9 ANXIETY DISORDER, UNSPECIFIED: ICD-10-CM

## 2025-07-29 PROCEDURE — 99024 POSTOP FOLLOW-UP VISIT: CPT

## 2025-07-30 LAB
PSA FREE FLD-MCNC: 24 %
PSA FREE SERPL-MCNC: 0.05 NG/ML
PSA SERPL-MCNC: 0.21 NG/ML

## 2025-08-05 ENCOUNTER — APPOINTMENT (OUTPATIENT)
Dept: UROLOGY | Facility: CLINIC | Age: 60
End: 2025-08-05

## 2025-08-18 ENCOUNTER — TRANSCRIPTION ENCOUNTER (OUTPATIENT)
Age: 60
End: 2025-08-18

## 2025-08-18 RX ORDER — SILDENAFIL 100 MG/1
100 TABLET, FILM COATED ORAL
Qty: 6 | Refills: 0 | Status: ACTIVE | COMMUNITY
Start: 2025-08-18 | End: 1900-01-01

## 2025-08-21 ENCOUNTER — TRANSCRIPTION ENCOUNTER (OUTPATIENT)
Age: 60
End: 2025-08-21

## 2025-08-25 ENCOUNTER — TRANSCRIPTION ENCOUNTER (OUTPATIENT)
Age: 60
End: 2025-08-25

## (undated) DEVICE — ELCTR PLASMA LOOP MEDIUM ANGLED 24FR 12-30 DEG

## (undated) DEVICE — FOLEY CATH 3-WAY 22FR 5CC LATEX LUBRICATH

## (undated) DEVICE — PACK CYSTO

## (undated) DEVICE — TUBING THERMADX UROLOGY

## (undated) DEVICE — VENODYNE/SCD SLEEVE CALF MEDIUM

## (undated) DEVICE — BAG URINE W METER 2L

## (undated) DEVICE — GLV 7.5 PROTEXIS (CREAM) MICRO

## (undated) DEVICE — TUBING LEVEL ONE NORMOFLO SET

## (undated) DEVICE — POSITIONER STRAP ARMBOARD VELCRO TS-30

## (undated) DEVICE — WARMING BLANKET UPPER ADULT

## (undated) DEVICE — CANISTER DISPOSABLE THIN WALL 3000CC

## (undated) DEVICE — SOL IRR POUR H2O 1500ML

## (undated) DEVICE — TUBING SET TUR BLADDER IRRIGATION Y-TYPE 81"

## (undated) DEVICE — GLV 8 PROTEXIS (CREAM) MICRO